# Patient Record
Sex: FEMALE | Race: WHITE
[De-identification: names, ages, dates, MRNs, and addresses within clinical notes are randomized per-mention and may not be internally consistent; named-entity substitution may affect disease eponyms.]

---

## 2023-04-12 ENCOUNTER — HOSPITAL ENCOUNTER (OUTPATIENT)
Dept: HOSPITAL 75 - RAD | Age: 53
End: 2023-04-12
Attending: OBSTETRICS & GYNECOLOGY
Payer: COMMERCIAL

## 2023-04-12 DIAGNOSIS — Z12.31: Primary | ICD-10-CM

## 2023-04-12 PROCEDURE — 77067 SCR MAMMO BI INCL CAD: CPT

## 2023-04-12 PROCEDURE — 77063 BREAST TOMOSYNTHESIS BI: CPT

## 2023-04-12 NOTE — DIAGNOSTIC IMAGING REPORT
3D bilateral screening mammogram with CAD.



This study was compared to the prior exams of 01/20/2020. 



At this time there are no current complaints. 



The current study was also evaluated with a Computer Aided

Detection (CAD) system.



FINDINGS: There are scattered fibroglandular densities in both

breasts which could obscure a lesion. Overall, there does not

appear to have been any significant change when compared to the

prior exam. No primary or secondary sign of malignancy is noted.



IMPRESSION: 

1.  There is no radiographic evidence for malignancy

2. The patient should have her annual bilateral screening

mammogram on schedule in April 2024.



ACR BI-RADS Category 1: Negative.

Result letter will be mailed to the patient.

Note:  At least 10% of breast cancer is not imaged by

mammography.



Dictated by: 



  Dictated on workstation # [id number]

## 2023-04-18 ENCOUNTER — HOSPITAL ENCOUNTER (OUTPATIENT)
Dept: HOSPITAL 75 - PREOP | Age: 53
Discharge: HOME | End: 2023-04-18
Attending: OBSTETRICS & GYNECOLOGY
Payer: COMMERCIAL

## 2023-04-18 VITALS — BODY MASS INDEX: 29.6 KG/M2 | WEIGHT: 195.33 LBS | HEIGHT: 67.99 IN

## 2023-04-18 DIAGNOSIS — Z01.818: Primary | ICD-10-CM

## 2023-04-25 ENCOUNTER — HOSPITAL ENCOUNTER (OUTPATIENT)
Dept: HOSPITAL 75 - SDC | Age: 53
Discharge: HOME | End: 2023-04-25
Attending: OBSTETRICS & GYNECOLOGY
Payer: COMMERCIAL

## 2023-04-25 VITALS — SYSTOLIC BLOOD PRESSURE: 120 MMHG | DIASTOLIC BLOOD PRESSURE: 74 MMHG

## 2023-04-25 VITALS — SYSTOLIC BLOOD PRESSURE: 106 MMHG | DIASTOLIC BLOOD PRESSURE: 70 MMHG

## 2023-04-25 VITALS — SYSTOLIC BLOOD PRESSURE: 107 MMHG | DIASTOLIC BLOOD PRESSURE: 66 MMHG

## 2023-04-25 VITALS — SYSTOLIC BLOOD PRESSURE: 103 MMHG | DIASTOLIC BLOOD PRESSURE: 66 MMHG

## 2023-04-25 VITALS — DIASTOLIC BLOOD PRESSURE: 76 MMHG | SYSTOLIC BLOOD PRESSURE: 111 MMHG

## 2023-04-25 VITALS — SYSTOLIC BLOOD PRESSURE: 115 MMHG | DIASTOLIC BLOOD PRESSURE: 73 MMHG

## 2023-04-25 VITALS — SYSTOLIC BLOOD PRESSURE: 116 MMHG | DIASTOLIC BLOOD PRESSURE: 71 MMHG

## 2023-04-25 VITALS — SYSTOLIC BLOOD PRESSURE: 107 MMHG | DIASTOLIC BLOOD PRESSURE: 70 MMHG

## 2023-04-25 VITALS — DIASTOLIC BLOOD PRESSURE: 71 MMHG | SYSTOLIC BLOOD PRESSURE: 116 MMHG

## 2023-04-25 VITALS — SYSTOLIC BLOOD PRESSURE: 143 MMHG | DIASTOLIC BLOOD PRESSURE: 90 MMHG

## 2023-04-25 DIAGNOSIS — E66.9: ICD-10-CM

## 2023-04-25 DIAGNOSIS — Z30.432: Primary | ICD-10-CM

## 2023-04-25 DIAGNOSIS — R87.610: ICD-10-CM

## 2023-04-25 LAB
HCT VFR BLD CALC: 38 % (ref 35–52)
HGB BLD-MCNC: 13.4 G/DL (ref 11.5–16)
MCH RBC QN AUTO: 32 PG (ref 25–34)
MCHC RBC AUTO-ENTMCNC: 35 G/DL (ref 32–36)
MCV RBC AUTO: 93 FL (ref 80–99)
PLATELET # BLD: 240 10^3/UL (ref 130–400)
PMV BLD AUTO: 11.1 FL (ref 9–12.2)
WBC # BLD AUTO: 5.4 10^3/UL (ref 4.3–11)

## 2023-04-25 PROCEDURE — 86900 BLOOD TYPING SEROLOGIC ABO: CPT

## 2023-04-25 PROCEDURE — 86850 RBC ANTIBODY SCREEN: CPT

## 2023-04-25 PROCEDURE — 86901 BLOOD TYPING SEROLOGIC RH(D): CPT

## 2023-04-25 PROCEDURE — 84703 CHORIONIC GONADOTROPIN ASSAY: CPT

## 2023-04-25 PROCEDURE — 36415 COLL VENOUS BLD VENIPUNCTURE: CPT

## 2023-04-25 PROCEDURE — 82947 ASSAY GLUCOSE BLOOD QUANT: CPT

## 2023-04-25 PROCEDURE — 85027 COMPLETE CBC AUTOMATED: CPT

## 2023-04-25 PROCEDURE — 87081 CULTURE SCREEN ONLY: CPT

## 2023-04-25 NOTE — ANESTHESIA-GENERAL POST-OP
General


Patient Condition


Mental Status/LOC:  Same as Preop


Cardiovascular:  Satisfactory


Nausea/Vomiting:  Absent


Respiratory:  Satisfactory


Pain:  Controlled


Complications:  Absent





Post Op Complications


Complications


None





Follow Up Care/Instructions


Patient Instructions


None needed.





Anesthesia/Patient Condition


Patient Condition


Patient is doing well, no complaints, stable vital signs, no apparent adverse 

anesthesia problems.   


No complications reported per nursing.











ANDRES BERMAN CRNA         Apr 25, 2023 13:48

## 2023-04-25 NOTE — OPERATIVE REPORT
DATE OF SERVICE: 04/25/2023



PREOPERATIVE DIAGNOSIS:

A 52-year-old female with retained IUD.



POSTOPERATIVE DIAGNOSIS:

A 52-year-old female with retained IUD.



PROCEDURE:

Removal of IUD under anesthesia and examination under anesthesia.



SURGEON:

Jackie Perkins DO



ANESTHESIA:

LMA general.



ESTIMATED BLOOD LOSS:

Minimal.



URINE OUTPUT:

Not recorded.



FLUIDS:

500 mL lactated Ringer's solution.



SPECIMEN SENT:

None.



INDICATIONS FOR PROCEDURE:

This 52-year-old female was a patient who had sought care in my office for 

removal of a copper IUD.  The patient reports she had it for 12 to 15 years and 

is past due for removal.  She is 52 years old and menopause and wants it taken 

out.  I attempted to do so in the office; however, the patient became acutely 

uncomfortable and the string did break in the office I had told  the patient 

that this device is likely either embedded in the uterus or dislodged and out of

place, I discussed with the patient continuing to attempt removal in the office,

which she was very uncomfortable with doing or proceeding with this under 

sedation anesthesia, the patient wished to be sedated. Risk of that was 

discussed with the patient in detail and after all of her questions were 

answered, she was agreeable to proceed.  Consent was obtained.  The patient was 

taken to the operating room.



OPERATIVE DESCRIPTION IN DETAIL

Once in the operating room, anesthesia was found to be adequate.  She was placed

in dorsal lithotomy position, prepped and draped in normal sterile fashion.  A 

timeout was performed.  Graves speculum inserted to the patient's vagina, which 

allows me to grasp the cervix at the 12 o'clock position using a long Allis 

clamp.  I then using a long Gabriela clamp and after teasing the device, I am able 

to bend it out in the appropriate fashion intact.  It appeared to be a ParaGard 

copper IUD, after which there was no active bleeding noted from the cervix.  I 

removed all the instruments from the patient's vagina.  The patient tolerated 

the procedure well and sent to recovery area in stable condition.  Lap and 

sponge count was correct at the end of the procedure.  Instrument counts correct

as well.





Job ID: [id number]

DocumentID: [id number]

Dictated Date: 04/25/2023 12:13:26

Transcription Date: 04/25/2023 21:24:00

Dictated By: JACKIE PERKINS DO

[facility]

## 2023-04-25 NOTE — PROGRESS NOTE-PRE OPERATIVE
Pre-Operative Progress Note


Date of Available H&P:  Apr 25, 2023


Date H&P Reviewed:  Apr 25, 2023


Time H&P Reviewed:  09:15


History & Physical:  H&P Reviewed, Patient Examed, No changes noted


Pre-Operative Diagnosis:  Retained IUD











JACKIE PERKINS DO            Apr 25, 2023 09:44

## 2023-04-25 NOTE — DISCHARGE INST-WOMEN'S SERVICE
Discharge Inst-Women's Serv


Depart Medication/Instructions


New, Converted or Re-Newed RX:  Other (use OTC )


Problems Reviewed?:  Yes





Consults/Follow Up


Additional Follow Up:  Yes


Orders/Referrals


Dr. Perkins/NP in 4-6 weeks





Activity


Activity:  Activity as Tolerated


Driving Instructions:  You May Drive


NO SMOKING:  NO SMOKING


Nothing Inside Vagina:  No Douching, No [place]burn, No Tampons





Diet


Discharge Diet:  No Restrictions


Symptoms to Report to :  Bleeding Excessive, Pain Increased, Fever Over 101 

Degrees F, Vaginal Bleeding Increase


For Any Problems or Questions:  Contact Your Physician











JACKIE PERKINS DO            Apr 25, 2023 09:46